# Patient Record
(demographics unavailable — no encounter records)

---

## 2024-12-20 NOTE — ADDENDUM
[FreeTextEntry1] : I, Sirena Richardson, am scribing for and the presence of Dr. Young the following sections: HPI, PMH,Family/social history, ROS, Physical Exam, Assessment / Plan.   I, Kyle Young, personally performed the services described in the documentation, reviewed the documentation recorded by the scribe in my presence and it accurately and completely records my words and actions.

## 2024-12-20 NOTE — HISTORY OF PRESENT ILLNESS
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   She reported symptoms of palpitations, SOB, dizziness during exercise and presented to her PCP where atrial flutter was documented on ECG.  Symptoms resolved after a couple of hours.  She has had 2-3 more of these episodes since, each lasting a few hours.  Denies syncope, chest pain. LE swelling, orthopnea, PND.  Her cardiologist Dr. Barrera has been in contact with her neurologist Dr. Pepe in regards to initiating anticoagulation.  Brain imaging reportedly showed sequelae of other prior bleeding episodes.  Per Neurology recommendations, she is at high risk for re-bleed on oral anticoagulation; he would be okay with ASA, or a short term oral anticoagulation.  She is currently taking Toprol XL 50mg daily and tolerating it well.  She reports a recently being diagnosed with 'low thyroid levels" and has an appointment with an endocrinologist for work up. She was diagnosed w/ hyperthyroid and initiated on Methimazole one day prior to her last visit.  Her endocrinologist is still waiting on complete blood work results to ensure she is on the correct dose of the medication.  Her palpitations returned a day prior to her visit w/ associated SOB and she was noted to be back in Afib w/ RVR.  She was instructed to take Flecainide 300mg POx1 which converted her to sinus.  Her thyroid function had normalized with medication, and she had improvement of palpitations.   She reported brief fluttering lasting 5-10 seconds, longest 1min.  Her Metoprolol was increased to 75mg PO QD.  She returns for follow today.  Her palpitations have become less frequent and shorter in duration since the increased dose of BB.  She feels well. ECG today shows sinus rhythm 81bpm.   -------- Stress Echo 3/8/2023: all regions of the heart were normal at rest and hyperkinetic after stress, baseline EF 70%, no ischemia, LVH, mild TR.  ECG 6/6/2023: Typical-appearing atrial flutter ECG 9/12/23: Afib RVR HR 124bpm ECG 2/1/24: sinus bradycardia HR 56bpm ECG 9/17/24: sinus rhythm 72bpm

## 2024-12-20 NOTE — REVIEW OF SYSTEMS
[SOB] : shortness of breath [Palpitations] : palpitations [Dizziness] : dizziness [Negative] : Heme/Lymph [Feeling Fatigued] : not feeling fatigued [Dyspnea on exertion] : not dyspnea during exertion [Chest Discomfort] : no chest discomfort [Orthopnea] : no orthopnea [Syncope] : no syncope

## 2025-07-10 NOTE — HISTORY OF PRESENT ILLNESS
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   She reported symptoms of palpitations, SOB, dizziness during exercise and presented to her PCP where atrial flutter was documented on ECG.  Symptoms resolved after a couple of hours.  She has had 2-3 more of these episodes since, each lasting a few hours.  Her cardiologist Dr. Barrera has been in contact with her neurologist Dr. Pepe in regards to initiating anticoagulation.  Brain imaging reportedly showed sequelae of other prior bleeding episodes.  Per Neurology recommendations, she is at high risk for re-bleed on oral anticoagulation; he would be okay with ASA, or a short term oral anticoagulation.   She reports a recently being diagnosed with 'low thyroid levels" and has an appointment with an endocrinologist for work up. She was diagnosed w/ hyperthyroid and initiated on Methimazole.  Her palpitations returned a day prior to her visit w/ associated SOB and she was noted to be back in Afib w/ RVR.  She was instructed to take Flecainide 300mg POx1 which converted her to sinus.   Her thyroid function had normalized with medication, and she had improvement of palpitations.  She continued to report brief fluttering and her metoprolol was increased.  Palpitations can last minutes.  She is now s/p ILR implant for AF surveillance implanted on 5/5/25.  She feels well today.  She recently returned from a trip to [place].  Since our last visit her palpitations have improved and she has had no prolonged symptoms.  ILR check today without evidence of arrhythmia.   -------- Stress Echo 3/8/2023: all regions of the heart were normal at rest and hyperkinetic after stress, baseline EF 70%, no ischemia, LVH, mild TR.  ECG 6/6/2023: Typical-appearing atrial flutter ECG 9/12/23: Afib RVR HR 124bpm ECG 2/1/24: sinus bradycardia HR 56bpm ECG 4/1/25: SR 76bpm ECG 9/17/24: sinus rhythm 72bpm

## 2025-07-10 NOTE — REVIEW OF SYSTEMS
[Palpitations] : palpitations [Negative] : Heme/Lymph [Fever] : no fever [Chills] : no chills [Feeling Fatigued] : not feeling fatigued [SOB] : no shortness of breath [Dyspnea on exertion] : not dyspnea during exertion [Chest Discomfort] : no chest discomfort [Orthopnea] : no orthopnea [Syncope] : no syncope [Dizziness] : no dizziness

## 2025-07-10 NOTE — HISTORY OF PRESENT ILLNESS
[FreeTextEntry1] : 62 yo female with HTN, hemorrhagic CVA 2020 in the setting of marked hypertension, PVCs (4.5% on event monitor 2/2023) and atrial flutter diagnosed 5/2023 who presents today for EP evaluation.   She reported symptoms of palpitations, SOB, dizziness during exercise and presented to her PCP where atrial flutter was documented on ECG.  Symptoms resolved after a couple of hours.  She has had 2-3 more of these episodes since, each lasting a few hours.  Her cardiologist Dr. Barrera has been in contact with her neurologist Dr. Pepe in regards to initiating anticoagulation.  Brain imaging reportedly showed sequelae of other prior bleeding episodes.  Per Neurology recommendations, she is at high risk for re-bleed on oral anticoagulation; he would be okay with ASA, or a short term oral anticoagulation.   She reports a recently being diagnosed with 'low thyroid levels" and has an appointment with an endocrinologist for work up. She was diagnosed w/ hyperthyroid and initiated on Methimazole.  Her palpitations returned a day prior to her visit w/ associated SOB and she was noted to be back in Afib w/ RVR.  She was instructed to take Flecainide 300mg POx1 which converted her to sinus.   Her thyroid function had normalized with medication, and she had improvement of palpitations.  She continued to report brief fluttering and her metoprolol was increased.  Palpitations can last minutes.  She is now s/p ILR implant for AF surveillance implanted on 5/5/25.  She feels well today.  She recently returned from a trip to [place].  Since our last visit her palpitations have improved and she has had no prolonged symptoms.  ILR check today without evidence of arrhythmia.   -------- Stress Echo 3/8/2023: all regions of the heart were normal at rest and hyperkinetic after stress, baseline EF 70%, no ischemia, LVH, mild TR.  ECG 6/6/2023: Typical-appearing atrial flutter ECG 9/12/23: Afib RVR HR 124bpm ECG 2/1/24: sinus bradycardia HR 56bpm ECG 4/1/25: SR 76bpm ECG 9/17/24: sinus rhythm 72bpm